# Patient Record
Sex: FEMALE | Race: WHITE
[De-identification: names, ages, dates, MRNs, and addresses within clinical notes are randomized per-mention and may not be internally consistent; named-entity substitution may affect disease eponyms.]

---

## 2018-08-13 ENCOUNTER — HOSPITAL ENCOUNTER (OUTPATIENT)
Dept: HOSPITAL 92 - BICMAMMO | Age: 73
Discharge: HOME | End: 2018-08-13
Attending: FAMILY MEDICINE
Payer: MEDICARE

## 2018-08-13 DIAGNOSIS — R92.1: ICD-10-CM

## 2018-08-13 DIAGNOSIS — Z12.31: Primary | ICD-10-CM

## 2018-08-13 PROCEDURE — 77067 SCR MAMMO BI INCL CAD: CPT

## 2018-08-13 PROCEDURE — 77063 BREAST TOMOSYNTHESIS BI: CPT

## 2019-08-14 ENCOUNTER — HOSPITAL ENCOUNTER (OUTPATIENT)
Dept: HOSPITAL 92 - BICMAMMO | Age: 74
Discharge: HOME | End: 2019-08-14
Attending: FAMILY MEDICINE
Payer: MEDICARE

## 2019-08-14 DIAGNOSIS — Z12.31: Primary | ICD-10-CM

## 2019-08-14 DIAGNOSIS — Z85.828: ICD-10-CM

## 2019-08-14 DIAGNOSIS — N63.20: ICD-10-CM

## 2019-08-14 PROCEDURE — 77067 SCR MAMMO BI INCL CAD: CPT

## 2019-08-14 PROCEDURE — 77063 BREAST TOMOSYNTHESIS BI: CPT

## 2019-08-14 NOTE — MMO
Bilateral MAMMO Bilat Screen [facility].

 

CLINICAL HISTORY:

Patient is 74 years old and is seen for screening. The patient has no family

history of breast cancer.  The patient has a history of Skin cancer at age 63.

 

VIEWS:

The views performed were:  bilateral craniocaudal with tomosynthesis and

bilateral mediolateral oblique with tomosynthesis.

 

FILMS COMPARED:

The present examination has been compared to prior imaging studies performed at

[facility] on 08/07/2015, 08/08/2016, 08/10/2017 and 08/13/2018.

 

MAMMOGRAM FINDINGS:

There are scattered fibroglandular densities.

 

There is a new oval mass measuring 9 millimeters with obscured margins seen in

the posterior upper-outer region of the left breast.

 

In the right breast, there are no suspicious masses, calcifications or areas of

architectural distortion.

 

IMPRESSION:

NEW MASS IN THE LEFT BREAST REQUIRES ADDITIONAL EVALUATION. SPOT COMPRESSION IS

RECOMMENDED. AN ULTRASOUND EXAM IS RECOMMENDED IF NEEDED.

 

THE RESULTS OF THIS EXAM WERE SENT TO THE PATIENT.

 

ACR BI-RADS Category 0 - Incomplete:  Need additional imaging evaluation. [facility] will notify the patient of the need for additional imaging services.

 

MAMMOGRAPHY NOTE:

 1. A negative mammogram report should not delay a biopsy if a dominant of

 clinically suspicious mass is present.

 2. Approximately 10% to 15% of breast cancers are not detected by

 mammography.

 3. Adenosis and dense breasts may obscure an underlying neoplasm.

 

 

Reported by: HEAVEN FULTON MD    Electonically Signed: [id number]

## 2019-08-16 ENCOUNTER — HOSPITAL ENCOUNTER (OUTPATIENT)
Dept: HOSPITAL 92 - BICMAMMO | Age: 74
Discharge: HOME | End: 2019-08-16
Attending: FAMILY MEDICINE
Payer: MEDICARE

## 2019-08-16 DIAGNOSIS — N63.20: Primary | ICD-10-CM

## 2019-08-16 PROCEDURE — G0279 TOMOSYNTHESIS, MAMMO: HCPCS

## 2019-08-16 PROCEDURE — 76642 ULTRASOUND BREAST LIMITED: CPT

## 2019-08-16 PROCEDURE — 77065 DX MAMMO INCL CAD UNI: CPT

## 2019-08-16 NOTE — ULT
LEFT BREAST DIAGNOSTIC ULTRASOUND:

 

INDICATION: 

Followup left breast masses.

 

FINDINGS: 

Within the left breast 12 o'clock position 2 cm from the nipple, there is a 0.8 x 0.5 cm cyst.  An ad
ditional simple-appearing cyst is seen in the left breast 1 o'clock position 4 cm from the nipple mei
suring 3 x 2.7 mm.  

 

IMPRESSION: 

BIRADS category 2 - benign.

 

POS: OFF

## 2019-08-20 NOTE — MMO
Left Breast MAMMO Unilat Diag DDI LT+RITA.

 

CLINICAL HISTORY:

Patient is 74 years old and is seen for additional evaluation requested from

prior study. The patient has no family history of breast cancer.  The patient

has a history of Skin cancer at age 63.

 

VIEWS:

The views performed were:  left craniocaudal spot compression with

tomosynthesis; left mediolateral oblique spot compression with tomosynthesis;

and left mediolateral with tomosynthesis.

 

FILMS COMPARED:

The present examination has been compared to prior imaging studies performed at

[facility] on 08/10/2017, 08/13/2018, 08/14/2019 and 08/16/2019.

 

MAMMOGRAM FINDINGS:

There are scattered fibroglandular densities.

 

Finding 1:  There is an equal density, oval mass measuring 9 millimeters seen in

the middle region of the left breast at 12 o'clock.

 

The mass was shown to be a cyst on ultrasound.

 

Finding 2:  There is an oval mass measuring 3 millimeters seen in the middle

region of the left breast at 1 o'clock.

 

The mass was shown to be a cyst on ultrasound.

 

There are no suspicious masses, suspicious calcifications, or new areas of

architectural distortion.

 

IMPRESSION:

FINDING 1:  MASS IN THE MIDDLE REGION OF THE LEFT BREAST AT 12 O'CLOCK IS BENIGN.

 

FINDING 2:  MASS IN THE MIDDLE REGION OF THE LEFT BREAST AT 1 O'CLOCK IS BENIGN.

 

THE FINDINGS AND RECOMMENDATIONS WERE DISCUSSED WITH THE PATIENT PRIOR TO HER

LEAVING THE CENTER.

 

A ROUTINE FOLLOW-UP MAMMOGRAM IN 1 YEAR IS RECOMMENDED.

 

THE RESULTS OF THIS EXAM WERE SENT TO THE PATIENT.

 

ACR BI-RADS Category 2 - Benign finding

 

MAMMOGRAPHY NOTE:

 1. A negative mammogram report should not delay a biopsy if a dominant of

 clinically suspicious mass is present.

 2. Approximately 10% to 15% of breast cancers are not detected by

 mammography.

 3. Adenosis and dense breasts may obscure an underlying neoplasm.

 

 

Reported by: NIMISHA COLLINS MD    Electonically Signed: [id number]

## 2020-06-04 ENCOUNTER — HOSPITAL ENCOUNTER (OUTPATIENT)
Dept: HOSPITAL 92 - BICRAD | Age: 75
Discharge: HOME | End: 2020-06-04
Attending: FAMILY MEDICINE
Payer: MEDICARE

## 2020-06-04 DIAGNOSIS — M47.814: ICD-10-CM

## 2020-06-04 DIAGNOSIS — M47.816: ICD-10-CM

## 2020-06-04 DIAGNOSIS — M54.5: ICD-10-CM

## 2020-06-04 DIAGNOSIS — M41.9: Primary | ICD-10-CM

## 2020-06-04 DIAGNOSIS — M43.17: ICD-10-CM

## 2020-06-04 PROCEDURE — 72072 X-RAY EXAM THORAC SPINE 3VWS: CPT

## 2020-06-04 PROCEDURE — 72100 X-RAY EXAM L-S SPINE 2/3 VWS: CPT

## 2020-06-04 NOTE — RAD
LUMBAR SPINE 2 VIEWS:

AP and lateral views obtained weightbearing.

 

INDICATION: 

Back pain.  Scoliosis.

 

FINDINGS: 

In the AP projection, there is scoliotic curvature to the right with apex at L3-4 measured at approxi
mately 15 degrees.  On the lateral view, there is a grade I anterolisthesis at L5-S1.  There may be p
osterior spondylolysis, although this is not confirmed.

 

There is loss of disk space at L4-5.  Moderate degenerative spurring.  Facet hypertrophy.  

 

IMPRESSION: 

Moderate degenerative change with mild scoliotic curvature as described above.  Grade I anterolisthes
is at L5-S1.

 

POS: AGW

## 2020-06-04 NOTE — RAD
THORACIC SPINE 3 VIEWS:

 

Date:  06/04/2020

 

INDICATION:

Back pain and scoliosis. 

 

FINDINGS:

Thoracic vertebra maintain height and alignment. Mild to moderate degenerative spurring is seen. No c
ompression deformity. No lytic or blastic process. 

 

IMPRESSION: 

Mild to moderate degenerative changes of the thoracic spine. 

 

 

POS: AGW

## 2020-10-07 ENCOUNTER — HOSPITAL ENCOUNTER (INPATIENT)
Dept: HOSPITAL 92 - ERS | Age: 75
LOS: 3 days | Discharge: HOME | DRG: 179 | End: 2020-10-10
Attending: FAMILY MEDICINE | Admitting: FAMILY MEDICINE
Payer: MEDICARE

## 2020-10-07 VITALS — BODY MASS INDEX: 20.5 KG/M2

## 2020-10-07 DIAGNOSIS — Z79.899: ICD-10-CM

## 2020-10-07 DIAGNOSIS — Z79.890: ICD-10-CM

## 2020-10-07 DIAGNOSIS — Z23: ICD-10-CM

## 2020-10-07 DIAGNOSIS — R07.89: ICD-10-CM

## 2020-10-07 DIAGNOSIS — E78.00: ICD-10-CM

## 2020-10-07 DIAGNOSIS — Z88.7: ICD-10-CM

## 2020-10-07 DIAGNOSIS — Z90.49: ICD-10-CM

## 2020-10-07 DIAGNOSIS — E03.9: ICD-10-CM

## 2020-10-07 DIAGNOSIS — Z85.828: ICD-10-CM

## 2020-10-07 DIAGNOSIS — E78.5: ICD-10-CM

## 2020-10-07 DIAGNOSIS — U07.1: Primary | ICD-10-CM

## 2020-10-07 DIAGNOSIS — K21.9: ICD-10-CM

## 2020-10-07 LAB
ALBUMIN SERPL BCG-MCNC: 4.4 G/DL (ref 3.4–4.8)
ALP SERPL-CCNC: 62 U/L (ref 40–110)
ALT SERPL W P-5'-P-CCNC: 21 U/L (ref 8–55)
ANION GAP SERPL CALC-SCNC: 15 MMOL/L (ref 10–20)
AST SERPL-CCNC: 21 U/L (ref 5–34)
BASOPHILS # BLD AUTO: 0 THOU/UL (ref 0–0.2)
BASOPHILS NFR BLD AUTO: 0.4 % (ref 0–1)
BILIRUB SERPL-MCNC: 0.7 MG/DL (ref 0.2–1.2)
BUN SERPL-MCNC: 12 MG/DL (ref 9.8–20.1)
CALCIUM SERPL-MCNC: 10.1 MG/DL (ref 7.8–10.44)
CHLORIDE SERPL-SCNC: 102 MMOL/L (ref 98–107)
CO2 SERPL-SCNC: 24 MMOL/L (ref 23–31)
CREAT CL PREDICTED SERPL C-G-VRATE: 0 ML/MIN (ref 70–130)
EOSINOPHIL # BLD AUTO: 0.1 THOU/UL (ref 0–0.7)
EOSINOPHIL NFR BLD AUTO: 1.3 % (ref 0–10)
GLOBULIN SER CALC-MCNC: 2.9 G/DL (ref 2.4–3.5)
GLUCOSE SERPL-MCNC: 104 MG/DL (ref 83–110)
HGB BLD-MCNC: 14.9 G/DL (ref 12–16)
LIPASE SERPL-CCNC: 26 U/L (ref 8–78)
LYMPHOCYTES # BLD: 1.2 THOU/UL (ref 1.2–3.4)
LYMPHOCYTES NFR BLD AUTO: 27.6 % (ref 21–51)
MAGNESIUM SERPL-MCNC: 1.9 MG/DL (ref 1.6–2.6)
MCH RBC QN AUTO: 32.6 PG (ref 27–31)
MCV RBC AUTO: 98.1 FL (ref 78–98)
MONOCYTES # BLD AUTO: 0.5 THOU/UL (ref 0.11–0.59)
MONOCYTES NFR BLD AUTO: 11.1 % (ref 0–10)
NEUTROPHILS # BLD AUTO: 2.7 THOU/UL (ref 1.4–6.5)
NEUTROPHILS NFR BLD AUTO: 59.6 % (ref 42–75)
PLATELET # BLD AUTO: 251 THOU/UL (ref 130–400)
POTASSIUM SERPL-SCNC: 4.2 MMOL/L (ref 3.5–5.1)
RBC # BLD AUTO: 4.58 MILL/UL (ref 4.2–5.4)
SODIUM SERPL-SCNC: 137 MMOL/L (ref 136–145)
TROPONIN I SERPL DL<=0.01 NG/ML-MCNC: (no result) NG/ML (ref ?–0.03)
WBC # BLD AUTO: 4.5 THOU/UL (ref 4.8–10.8)

## 2020-10-07 PROCEDURE — 81015 MICROSCOPIC EXAM OF URINE: CPT

## 2020-10-07 PROCEDURE — 96375 TX/PRO/DX INJ NEW DRUG ADDON: CPT

## 2020-10-07 PROCEDURE — 87635 SARS-COV-2 COVID-19 AMP PRB: CPT

## 2020-10-07 PROCEDURE — 83880 ASSAY OF NATRIURETIC PEPTIDE: CPT

## 2020-10-07 PROCEDURE — 83090 ASSAY OF HOMOCYSTEINE: CPT

## 2020-10-07 PROCEDURE — 96372 THER/PROPH/DIAG INJ SC/IM: CPT

## 2020-10-07 PROCEDURE — 36415 COLL VENOUS BLD VENIPUNCTURE: CPT

## 2020-10-07 PROCEDURE — 90662 IIV NO PRSV INCREASED AG IM: CPT

## 2020-10-07 PROCEDURE — 85730 THROMBOPLASTIN TIME PARTIAL: CPT

## 2020-10-07 PROCEDURE — 84443 ASSAY THYROID STIM HORMONE: CPT

## 2020-10-07 PROCEDURE — 83735 ASSAY OF MAGNESIUM: CPT

## 2020-10-07 PROCEDURE — 85025 COMPLETE CBC W/AUTO DIFF WBC: CPT

## 2020-10-07 PROCEDURE — 86147 CARDIOLIPIN ANTIBODY EA IG: CPT

## 2020-10-07 PROCEDURE — G0378 HOSPITAL OBSERVATION PER HR: HCPCS

## 2020-10-07 PROCEDURE — 80076 HEPATIC FUNCTION PANEL: CPT

## 2020-10-07 PROCEDURE — 81240 F2 GENE: CPT

## 2020-10-07 PROCEDURE — 85307 ASSAY ACTIVATED PROTEIN C: CPT

## 2020-10-07 PROCEDURE — U0003 INFECTIOUS AGENT DETECTION BY NUCLEIC ACID (DNA OR RNA); SEVERE ACUTE RESPIRATORY SYNDROME CORONAVIRUS 2 (SARS-COV-2) (CORONAVIRUS DISEASE [COVID-19]), AMPLIFIED PROBE TECHNIQUE, MAKING USE OF HIGH THROUGHPUT TECHNOLOGIES AS DESCRIBED BY CMS-2020-01-R: HCPCS

## 2020-10-07 PROCEDURE — 96374 THER/PROPH/DIAG INJ IV PUSH: CPT

## 2020-10-07 PROCEDURE — 85610 PROTHROMBIN TIME: CPT

## 2020-10-07 PROCEDURE — 85303 CLOT INHIBIT PROT C ACTIVITY: CPT

## 2020-10-07 PROCEDURE — 93005 ELECTROCARDIOGRAM TRACING: CPT

## 2020-10-07 PROCEDURE — G0008 ADMIN INFLUENZA VIRUS VAC: HCPCS

## 2020-10-07 PROCEDURE — 71045 X-RAY EXAM CHEST 1 VIEW: CPT

## 2020-10-07 PROCEDURE — 94760 N-INVAS EAR/PLS OXIMETRY 1: CPT

## 2020-10-07 PROCEDURE — 84484 ASSAY OF TROPONIN QUANT: CPT

## 2020-10-07 PROCEDURE — 80061 LIPID PANEL: CPT

## 2020-10-07 PROCEDURE — 83690 ASSAY OF LIPASE: CPT

## 2020-10-07 PROCEDURE — 81241 F5 GENE: CPT

## 2020-10-07 PROCEDURE — 80053 COMPREHEN METABOLIC PANEL: CPT

## 2020-10-07 PROCEDURE — C9113 INJ PANTOPRAZOLE SODIUM, VIA: HCPCS

## 2020-10-07 PROCEDURE — 93017 CV STRESS TEST TRACING ONLY: CPT

## 2020-10-07 PROCEDURE — 85300 ANTITHROMBIN III ACTIVITY: CPT

## 2020-10-07 PROCEDURE — A9500 TC99M SESTAMIBI: HCPCS

## 2020-10-07 PROCEDURE — 85379 FIBRIN DEGRADATION QUANT: CPT

## 2020-10-07 PROCEDURE — 85598 HEXAGNAL PHOSPH PLTLT NEUTRL: CPT

## 2020-10-07 PROCEDURE — 85305 CLOT INHIBIT PROT S TOTAL: CPT

## 2020-10-07 PROCEDURE — 80048 BASIC METABOLIC PNL TOTAL CA: CPT

## 2020-10-07 PROCEDURE — 90471 IMMUNIZATION ADMIN: CPT

## 2020-10-07 PROCEDURE — 85240 CLOT FACTOR VIII AHG 1 STAGE: CPT

## 2020-10-07 PROCEDURE — 78452 HT MUSCLE IMAGE SPECT MULT: CPT

## 2020-10-07 NOTE — RAD
CHEST ONE VIEW:

10/7/20

 

HISTORY: 

Chest pain, nausea.

 

COMPARISON: 

9/22/09.

 

FINDINGS: 

Heart size is normal. Lungs are clear. No confluent pneumonia, overt edema, or pleural effusion. Evid
ence for old granulomatous disease. Postoperative changes of both humeral heads. 

 

IMPRESSION: 

No significant acute intrathoracic disease. Old granulomatous disease. 

 

POS: RRE

## 2020-10-08 LAB
SARS-COV-2 RNA RESP QL NAA+PROBE: DETECTED
TROPONIN I SERPL DL<=0.01 NG/ML-MCNC: (no result) NG/ML (ref ?–0.03)

## 2020-10-08 PROCEDURE — 8E0ZXY6 ISOLATION: ICD-10-PCS | Performed by: FAMILY MEDICINE

## 2020-10-08 PROCEDURE — 3E02340 INTRODUCTION OF INFLUENZA VACCINE INTO MUSCLE, PERCUTANEOUS APPROACH: ICD-10-PCS | Performed by: FAMILY MEDICINE

## 2020-10-08 RX ADMIN — Medication SCH ML: at 20:44

## 2020-10-08 RX ADMIN — ASPIRIN 81 MG CHEWABLE TABLET SCH MG: 81 TABLET CHEWABLE at 07:47

## 2020-10-08 RX ADMIN — Medication SCH ML: at 07:48

## 2020-10-08 NOTE — CON
DATE OF CONSULTATION:  



HISTORY OF PRESENT ILLNESS:  Amaya Liang is a 75-year-old female who came 
into

the hospital yesterday with complaints of 2-3 days of chest pain.  She has some

shortness of breath and noticed some discomfort going into her left arm.  This 
pain

was continuously for at least 1 day, although her cardiac enzymes were 
unremarkable.

 There was not a pleuritic component to the pain. 



PAST MEDICAL HISTORY:  Hypercholesterolemia, hypothyroidism, history of basal 
cell

cancer, pancreatitis. 



PAST SURGICAL HISTORY:  Cholecystectomy, carpal tunnel surgery in both hands,

bilateral shoulder surgery, hernia repair, tubal ligation. 



MEDICATIONS:  

1. Aleve 220 mg b.i.d. 

2. Atorvastatin 10 daily. 

3. Estradiol 0.5 daily. 

4. Levothyroxine 88 mcg daily.



ALLERGIES:  HORSE SERUM.



FAMILY HISTORY:  Father had myocardial infarctions in his 70s.



SOCIAL HISTORY:  She does not smoke or drink.



PHYSICAL EXAMINATION:

VITAL SIGNS:  Blood pressure 117/57, pulse of 84.  Physical exam is deferred due
to

being positive COVID. 



LABORATORY DATA:  EKG revealed normal sinus rhythm, possible left atrial

enlargement.  Adenosine Cardiolite revealed ejection fraction of 83% with normal

perfusion.  Cardiac enzymes are unremarkable.  Sodium 137, potassium 4.2, 
chloride

102, carbon dioxide 24, BUN 12, creatinine 0.83.  TSH is normal.  BNP 22.3.  
COVID

is positive.  Hemoglobin 14.9, hematocrit 44.9, white count 4500, platelets 
251,000. 



IMPRESSION:  

1. Noncardiac chest pain.

2. COVID positive.  

3. Hypothyroidism.

4. Hypercholesterolemia.



PLAN:  No further cardiac evaluation is warranted.  I will follow the patient

at a distance.. 







Job ID:  [id number]



[facility]

## 2020-10-08 NOTE — NM
CARDIAC SPECT:

 

CLINICAL HISTORY:

75-year-old female with dyslipidemia and chest pain. 

 

TECHNIQUE:  

A myocardial perfusion scan was performed using the single isotope one day protocol with technetium-9
9m sestamibi. 9 mCi were injected intravenously for the rest exam followed by 29 mCi for the stress e
xam. Pharmacologic stress with Adenosine was monitored and interpreted by IDA Villanueva NP. 

 

FINDINGS:

Homogeneous tracer distribution is seen in the myocardial segments on stress and rest images without 
fixed or reversible defects. 

 

GATED SPECT LVEF:

83%. 

 

WALL MOTION EXAM:

Normal. 

 

IMPRESSION: 

Normal myocardial perfusion scan. 

 

 

POS: AH

## 2020-10-08 NOTE — HP
CHIEF COMPLAINT:  Chest pain with nausea.



HISTORY OF PRESENT ILLNESS:  The patient is a 75-year-old female, who states 
that

she began to have substernal chest pain actually that had begun 2 to 3 days 
before

coming to the emergency room, but on the day of admission, she began to notice

shortness of breath and the discomfort began to go down her left arm.  It was

associated with a lot of nausea.  She did not actually vomit.  She has a history
of

pancreatitis in the past, but has never smoked and does not drink and her

gallbladder has been removed such that her risk factors were fairly low.  Dr. Perez

was contacted to place her in observation for further evaluation due to the 
other

risk factors that include her age and hyperlipidemia. 



PAST MEDICAL HISTORY:  Includes dyslipidemia, hypothyroidism, basal cell 
carcinoma

from her nose, pancreatitis. 



PAST SURGICAL HISTORY:  Includes aforementioned cholecystectomy, carpal tunnel

surgery on both hands, bilateral shoulder surgery, and removal of the basal cell

carcinoma from her nose.  Additional surgeries include hernia repair, tubal

ligation. 



SOCIAL HISTORY:  She is .  Has never used alcohol, cigarettes, or illicit

drugs. 



ALLERGIES:  TO HORSE SERUM.



CURRENT MEDICATIONS:  On admission include:

1. Aleve 220 mg b.i.d.

2. Vitamin D.

3. Atorvastatin 10 mg at bedtime.

4. Estradiol 0.5 mg daily.

5. Levothyroxine 88 mcg daily.



FAMILY HISTORY:  Positive for coronary artery disease.  Her father passed away 
of an

MI in his 70s and grandfather passed away of an MI in his 60s.  Negative for

diabetes, hypertension, or cancer. 



REVIEW OF SYSTEMS:  Constitutionally, she has had no fever, chills, or malaise. 

HEENT:  Denies drainage or discharge from eyes, ears, nose, or throat. 

CHEST:  Denies shortness of breath or cough. 

CARDIOVASCULAR:  Admits to chest discomfort as reason for admission.  Denies

palpitations. 

GI:  Admits to nausea, but no vomiting or diarrhea. 

:  Denies blood in urine or stool or dysuria. 

MUSCULOSKELETAL:  Has pain radiating down her left arm on admission that is 
resolved

at this time.  Otherwise, no other specific musculoskeletal complaints. 

SKIN:  No new rashes or lesions. 

NEUROLOGIC:  No trouble with mentation.  She did have some headaches on 
admission,

but denies photophobia or sonophobia. 



PHYSICAL EXAMINATION:

At the time of admission: 

VITAL SIGNS:  Blood pressure 109/65, O2 saturation 100% on room air, respiratory

rate 18, pulse 62, temperature 97.9, she weighs 127 pounds. 

GENERAL:  This is a well-developed, well-nourished,  female, alert,

oriented, cooperative. 

HEENT:  Normocephalic, atraumatic.  Pupils are equal, round, and reactive to 
light

with arcus senilis bilaterally.  TMs, nares, and pharynx clear. 

NECK:  Supple.  Trachea midline. 

CHEST:  Clear to auscultation.  Nontender to palpation. 

HEART:  Regular rate and rhythm without murmur. 

ABDOMEN:  Soft.  No tenderness.  No organomegaly. 

:  Deferred. 

EXTREMITIES:  Without clubbing, cyanosis, or edema.  Normal range of motion

demonstrated.  Symmetrical muscular tone noted in upper and lower extremities. 

NEUROLOGIC:  Cranial nerves are intact.  Mental status is clear.  Sensory exam 
is

grossly intact.  Unable to test cerebellar function or gait at this time. 



LABORATORY WORK:  On admission shows WBCs 4.5, hemoglobin 14.9, hematocrit 44.9 
with

platelets of 251.  Sodium is 137, potassium 4.2, chloride 102, CO2 of 24, BUN 
12,

creatinine 0.83 with a GFR 67, glucose 104, calcium 10.9.  Liver functions

unremarkable.  Troponin I is negative x2.  BNP at 22.3.  Liver functions normal.

Lipase 26.  TSH 1.72. Covid-19 +



ASSESSMENT:  

1. Chest pain probably due to severe gastroesophageal reflux.

2. Covid 19

3. Gastroesophageal reflux.

4. Dyslipidemia.

5. Hypothyroidism.  The patient is currently euthyroid.



PLAN: Appropriate isolation, Hydroxychloroquine, Zithromax, Zinc, monitor 
coagulopathy measures  Will get Cardiolite stress test.  Consult Dr. Sharp.  
Serial re-evaluation.

Begin proton pump inhibitor with Protonix and serially re-evaluate her. 







Job ID:  [id number]



[facility]

## 2020-10-09 LAB
ANION GAP SERPL CALC-SCNC: 8 MMOL/L (ref 10–20)
APTT PPP: 36.1 SEC (ref 22.9–36.1)
BUN SERPL-MCNC: 11 MG/DL (ref 9.8–20.1)
CALCIUM SERPL-MCNC: 8.7 MG/DL (ref 7.8–10.44)
CHD RISK SERPL-RTO: 3.2 (ref ?–4.5)
CHLORIDE SERPL-SCNC: 104 MMOL/L (ref 98–107)
CHOLEST SERPL-MCNC: 105 MG/DL
CO2 SERPL-SCNC: 27 MMOL/L (ref 23–31)
CREAT CL PREDICTED SERPL C-G-VRATE: 54 ML/MIN (ref 70–130)
D DIMER PPP FEU-MCNC: 0.44 *MCG/ML (ref 0.27–0.43)
ELIA APS NEW METHOD: (no result)
ELIA CARD IGA 14-20 INTERP: (no result)
ELIA CARD IGG/M 10-40 INTERP: (no result)
GLUCOSE SERPL-MCNC: 126 MG/DL (ref 83–110)
HDLC SERPL-MCNC: 33 MG/DL
INR PPP: 1
LDLC SERPL CALC-MCNC: 49 MG/DL
POTASSIUM SERPL-SCNC: 3.9 MMOL/L (ref 3.5–5.1)
PROTHROMBIN TIME: 13.7 SEC (ref 12–14.7)
RBC UR QL AUTO: (no result) HPF (ref 0–3)
SODIUM SERPL-SCNC: 135 MMOL/L (ref 136–145)
TRIGL SERPL-MCNC: 116 MG/DL (ref ?–150)
WBC UR QL AUTO: (no result) HPF (ref 0–3)

## 2020-10-09 RX ADMIN — Medication SCH ML: at 19:37

## 2020-10-09 RX ADMIN — ASPIRIN 81 MG CHEWABLE TABLET SCH MG: 81 TABLET CHEWABLE at 08:26

## 2020-10-09 RX ADMIN — Medication SCH ML: at 08:26

## 2020-10-09 NOTE — RAD
PORTABLE CHEST:

 

Date:  10/09/2020

 

HISTORY:  

COVID-positive. 

 

COMPARISON:  

10/07/2020 exam. 

 

FINDINGS:

Heart size and mediastinum are within normal limits. Nodular densities in the lung bases are felt to 
be related to nipple shadows. No definite confluent infiltrative process. Postoperative changes of jacoby
th shoulders are noted. 

 

IMPRESSION: 

No active intrathoracic disease. 

 

 

POS: AISHA

## 2020-10-10 VITALS — TEMPERATURE: 98 F | SYSTOLIC BLOOD PRESSURE: 121 MMHG | DIASTOLIC BLOOD PRESSURE: 69 MMHG

## 2020-10-10 LAB
ALBUMIN SERPL BCG-MCNC: 3.7 G/DL (ref 3.4–4.8)
ALP SERPL-CCNC: 55 U/L (ref 40–110)
ALT SERPL W P-5'-P-CCNC: 14 U/L (ref 8–55)
ANION GAP SERPL CALC-SCNC: 12 MMOL/L (ref 10–20)
AST SERPL-CCNC: 15 U/L (ref 5–34)
BASOPHILS # BLD AUTO: 0 THOU/UL (ref 0–0.2)
BASOPHILS NFR BLD AUTO: 0.6 % (ref 0–1)
BILIRUB DIRECT SERPL-MCNC: 0.4 MG/DL (ref 0.1–0.3)
BILIRUB SERPL-MCNC: 0.8 MG/DL (ref 0.2–1.2)
BUN SERPL-MCNC: 7 MG/DL (ref 9.8–20.1)
CALCIUM SERPL-MCNC: 8.8 MG/DL (ref 7.8–10.44)
CHLORIDE SERPL-SCNC: 105 MMOL/L (ref 98–107)
CO2 SERPL-SCNC: 24 MMOL/L (ref 23–31)
CREAT CL PREDICTED SERPL C-G-VRATE: 61 ML/MIN (ref 70–130)
EOSINOPHIL # BLD AUTO: 0.1 THOU/UL (ref 0–0.7)
EOSINOPHIL NFR BLD AUTO: 2.4 % (ref 0–10)
GLUCOSE SERPL-MCNC: 107 MG/DL (ref 83–110)
HGB BLD-MCNC: 12.7 G/DL (ref 12–16)
LYMPHOCYTES # BLD: 1.1 THOU/UL (ref 1.2–3.4)
LYMPHOCYTES NFR BLD AUTO: 22.8 % (ref 21–51)
MCH RBC QN AUTO: 33.7 PG (ref 27–31)
MCV RBC AUTO: 96.7 FL (ref 78–98)
MONOCYTES # BLD AUTO: 0.4 THOU/UL (ref 0.11–0.59)
MONOCYTES NFR BLD AUTO: 9 % (ref 0–10)
NEUTROPHILS # BLD AUTO: 3.1 THOU/UL (ref 1.4–6.5)
NEUTROPHILS NFR BLD AUTO: 65.2 % (ref 42–75)
PLATELET # BLD AUTO: 255 THOU/UL (ref 130–400)
POTASSIUM SERPL-SCNC: 4 MMOL/L (ref 3.5–5.1)
RBC # BLD AUTO: 3.77 MILL/UL (ref 4.2–5.4)
SODIUM SERPL-SCNC: 137 MMOL/L (ref 136–145)
WBC # BLD AUTO: 4.7 THOU/UL (ref 4.8–10.8)

## 2020-10-10 RX ADMIN — Medication SCH: at 10:13

## 2020-10-10 RX ADMIN — ASPIRIN 81 MG CHEWABLE TABLET SCH MG: 81 TABLET CHEWABLE at 07:41

## 2020-10-14 LAB
AT III ACT/NOR PPP CHRO: 92 % ACTIVE (ref 85–130)
FACT VIII ACT/NOR PPP: 237.1 % ACTIVE (ref 56–157)

## 2020-10-15 ENCOUNTER — HOSPITAL ENCOUNTER (EMERGENCY)
Dept: HOSPITAL 92 - ERS | Age: 75
Discharge: HOME | End: 2020-10-15
Payer: MEDICARE

## 2020-10-15 DIAGNOSIS — U07.1: Primary | ICD-10-CM

## 2020-10-15 DIAGNOSIS — E03.9: ICD-10-CM

## 2020-10-15 DIAGNOSIS — E78.5: ICD-10-CM

## 2020-10-15 DIAGNOSIS — Z79.899: ICD-10-CM

## 2020-10-15 LAB
BASOPHILS # BLD AUTO: 0 THOU/UL (ref 0–0.2)
BASOPHILS NFR BLD AUTO: 0.3 % (ref 0–1)
EOSINOPHIL # BLD AUTO: 0.1 THOU/UL (ref 0–0.7)
EOSINOPHIL NFR BLD AUTO: 1.2 % (ref 0–10)
HGB BLD-MCNC: 13.2 G/DL (ref 12–16)
LYMPHOCYTES # BLD: 2.1 THOU/UL (ref 1.2–3.4)
LYMPHOCYTES NFR BLD AUTO: 26.3 % (ref 21–51)
MCH RBC QN AUTO: 34.1 PG (ref 27–31)
MCV RBC AUTO: 94.9 FL (ref 78–98)
MONOCYTES # BLD AUTO: 1 THOU/UL (ref 0.11–0.59)
MONOCYTES NFR BLD AUTO: 12 % (ref 0–10)
NEUTROPHILS # BLD AUTO: 4.9 THOU/UL (ref 1.4–6.5)
NEUTROPHILS NFR BLD AUTO: 60.2 % (ref 42–75)
PLATELET # BLD AUTO: 357 THOU/UL (ref 130–400)
RBC # BLD AUTO: 3.86 MILL/UL (ref 4.2–5.4)
WBC # BLD AUTO: 8.1 THOU/UL (ref 4.8–10.8)

## 2020-10-15 PROCEDURE — 36415 COLL VENOUS BLD VENIPUNCTURE: CPT

## 2020-10-15 PROCEDURE — 96375 TX/PRO/DX INJ NEW DRUG ADDON: CPT

## 2020-10-15 PROCEDURE — 85025 COMPLETE CBC W/AUTO DIFF WBC: CPT

## 2020-10-15 PROCEDURE — 96365 THER/PROPH/DIAG IV INF INIT: CPT

## 2020-10-15 PROCEDURE — 70450 CT HEAD/BRAIN W/O DYE: CPT

## 2020-10-15 NOTE — CT
CT HEAD WITHOUT CONTRAST:

 

Date:  10/15/2020

 

INDICATION:

Headache. 

 

No comparison. 

 

FINDINGS:

Ventricles have normal size and position. There are mild bilateral symmetric white matter hyperintens
ities most consistent with mild chronic ischemic white matter change in this age patient. There is no
 evidence of mass, infarct, or hemorrhage. Paranasal sinuses and mastoids are clear. 

 

IMPRESSION: 

No evidence of acute process. 

 

 

POS: AH

## 2020-12-11 ENCOUNTER — HOSPITAL ENCOUNTER (OUTPATIENT)
Dept: HOSPITAL 92 - BICMRI | Age: 75
Discharge: HOME | End: 2020-12-11
Attending: FAMILY MEDICINE
Payer: MEDICARE

## 2020-12-11 DIAGNOSIS — F03.90: Primary | ICD-10-CM

## 2020-12-11 DIAGNOSIS — I67.82: ICD-10-CM

## 2020-12-11 LAB — ESTIMATED GFR-MDRD - POC: 70

## 2020-12-11 PROCEDURE — 70553 MRI BRAIN STEM W/O & W/DYE: CPT

## 2020-12-11 PROCEDURE — 82565 ASSAY OF CREATININE: CPT

## 2020-12-11 PROCEDURE — A9579 GAD-BASE MR CONTRAST NOS,1ML: HCPCS

## 2020-12-11 NOTE — MRI
MRI OF THE BRAIN WITHOUT AND WITH CONTRAST:

 

HISTORY: 

Dementia.  The patient is unable to converse for very long since the summer of 2020.

 

COMPARISON: 

CT brain 10/15/2020.

 

TECHNIQUE: 

Multiplanar, multisequence MR images were obtained of the brain without and with IV contrast.

 

FINDINGS: 

There are scattered foci of high T2/FLAIR signal in the subcortical and periventricular white matter,
 likely secondary to small-vessel ischemic disease.  No restricted diffusion is seen to suggest an ac
[place] infarction.  No abnormal enhancement is seen on this exam.

 

There is no evidence of hydrocephalus, intracranial hemorrhage, or extraaxial fluid collection.  The 
expected flow voids are present.  The corpus callosum, pituitary, and craniocervical junction are unr
emarkable.  

 

The calvarium and overlying soft tissues are unremarkable.  The visualized paranasal sinuses and mast
oid air cells are well aerated.

 

IMPRESSION: 

Fairly extensive small-vessel ischemic disease without acute intracranial abnormality.

 

POS: EAA